# Patient Record
Sex: FEMALE | Race: WHITE | Employment: FULL TIME | URBAN - METROPOLITAN AREA
[De-identification: names, ages, dates, MRNs, and addresses within clinical notes are randomized per-mention and may not be internally consistent; named-entity substitution may affect disease eponyms.]

---

## 2017-03-17 PROBLEM — E66.3 OVERWEIGHT: Status: ACTIVE | Noted: 2017-03-17

## 2017-09-15 PROBLEM — E66.9 OBESITY (BMI 30-39.9): Status: ACTIVE | Noted: 2017-03-17

## 2019-08-16 ENCOUNTER — HOSPITAL ENCOUNTER (OUTPATIENT)
Dept: LAB | Age: 54
Discharge: HOME | End: 2019-08-16

## 2019-08-16 PROCEDURE — 88312 SPECIAL STAINS GROUP 1: CPT

## 2019-08-16 PROCEDURE — 88305 TISSUE EXAM BY PATHOLOGIST: CPT

## 2022-03-19 PROBLEM — E66.9 OBESITY (BMI 30-39.9): Status: ACTIVE | Noted: 2017-03-17

## 2023-08-01 ENCOUNTER — HOSPITAL ENCOUNTER (OUTPATIENT)
Dept: PHYSICAL THERAPY | Age: 58
Setting detail: RECURRING SERIES
Discharge: HOME | End: 2023-08-04
Payer: COMMERCIAL

## 2023-08-01 PROCEDURE — 97110 THERAPEUTIC EXERCISES: CPT

## 2023-08-01 PROCEDURE — 97161 PT EVAL LOW COMPLEX 20 MIN: CPT

## 2023-08-01 ASSESSMENT — PAIN SCALES - GENERAL: PAINLEVEL_OUTOF10: 5
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
## 2023-08-01 NOTE — PROGRESS NOTES
Dandre Charles  : 1965  [facility]: [facility]aveed [facility] ([facility]  Secondary:  [address] @ [address] [address] [facility] [address]  Phone: [phone]  Fax: [phone] Plan Frequency: 2 times/week    Plan of Care/Certification Expiration Date: 23      PT Visit Info:  Plan Frequency: 2 times/week  Plan of Care/Certification Expiration Date: 23      Visit Count:  1    OUTPATIENT PHYSICAL THERAPY:OP NOTE TYPE: OP Note Type: Treatment Note 2023       Episode  }Appt Desk             Treatment Diagnosis:  Pain in Left Knee (M25.562)  Pain in Right Knee (M25.561)  Other abnormalities of gait and mobility (R26.89)  Generalized Muscle Weakness (M62.81)  Other Low Back Pain (M54.59)    Goals: (Goals have been discussed and agreed upon with patient.)  Short-Term Functional Goals: Time Frame: 4 weeks  Pt to report compliance with HEP  Pt to demonstrate fluid movement patterns without cues  Discharge Goals: Time Frame: 8 weeks  Pt to increase strength for sit to stand x 30 reps without UE assist  Pt to increase strength for reciprocal gait on stairs with controlled descent  Pt to report ability to perform comprehensive HEP successfully    Medical/Referring Diagnosis:  Bilateral primary osteoarthritis of knee [M17.0]  Referring Physician:  INDU Cabrera MD Orders:  PT Eval and Treat   Date of Onset:  Onset Date:  (years)     Allergies:   Atorvastatin, Colesevelam, Ezetimibe-simvastatin, and Simvastatin  Restrictions/Precautions:  No data recordedNo data recorded     Interventions Planned (Treatment may consist of any combination of the following):    Current Treatment Recommendations: Strengthening; ROM; Balance training; Endurance training; Manual; Pain management; Home exercise program; Modalities; Aquatics     Subjective Comments:pt presents with chronic knee, back, neck pain due to faulty movement patterns, weakness, stiffness, altered gait, degenerative changes.   Initial:

## 2023-08-04 ENCOUNTER — HOSPITAL ENCOUNTER (OUTPATIENT)
Dept: PHYSICAL THERAPY | Age: 58
Setting detail: RECURRING SERIES
Discharge: HOME | End: 2023-08-07
Payer: COMMERCIAL

## 2023-08-04 PROCEDURE — 97113 AQUATIC THERAPY/EXERCISES: CPT

## 2023-08-04 NOTE — PROGRESS NOTES
Rachel Lagos  : 1965  Primary: Trevor Tillman ([facility])  Secondary:  [place] [facility] [address] [facility] [address]  Phone: [phone]  Fax: [phone] Plan Frequency: 2 times/week    Plan of Care/Certification Expiration Date: 23      PT Visit Info:  Plan Frequency: 2 times/week  Plan of Care/Certification Expiration Date: 23      Visit Count:  2    OUTPATIENT PHYSICAL THERAPY:OP NOTE TYPE: OP Note Type: Treatment Note 2023       Episode  }Appt Desk             Treatment Diagnosis:  Pain in Left Knee (M25.562)  Pain in Right Knee (M25.561)  Other abnormalities of gait and mobility (R26.89)  Generalized Muscle Weakness (M62.81)  Other Low Back Pain (M54.59)    Goals: (Goals have been discussed and agreed upon with patient.)  Short-Term Functional Goals: Time Frame: 4 weeks  Pt to report compliance with HEP  Pt to demonstrate fluid movement patterns without cues  Discharge Goals: Time Frame: 8 weeks  Pt to increase strength for sit to stand x 30 reps without UE assist  Pt to increase strength for reciprocal gait on stairs with controlled descent  Pt to report ability to perform comprehensive HEP successfully    Medical/Referring Diagnosis:  Bilateral primary osteoarthritis of knee [M17.0]  Referring Physician:  INDU Connor MD Orders:  PT Eval and Treat   Date of Onset:  Onset Date:  (years)     Allergies:   Atorvastatin, Colesevelam, Ezetimibe-simvastatin, and Simvastatin  Restrictions/Precautions:  No data recordedNo data recorded     Interventions Planned (Treatment may consist of any combination of the following):    Current Treatment Recommendations: Strengthening; ROM; Balance training; Endurance training; Manual; Pain management; Home exercise program; Modalities; Aquatics     Subjective Comments: I fell the other night because of something slippery on the floor. My neck still hurts.   Initial: 4/10                       Post Session:  no

## 2023-08-08 ENCOUNTER — HOSPITAL ENCOUNTER (OUTPATIENT)
Dept: PHYSICAL THERAPY | Age: 58
Setting detail: RECURRING SERIES
Discharge: HOME | End: 2023-08-11
Payer: COMMERCIAL

## 2023-08-08 PROCEDURE — 97113 AQUATIC THERAPY/EXERCISES: CPT

## 2023-08-08 NOTE — PROGRESS NOTES
Janessa Lombardi  : 1965  Primary: Gerir Tillman ([facility])  Secondary:  [address] @ [address] [address] [facility] [address]  Phone: [phone]  Fax: [phone] Plan Frequency: 2 times/week    Plan of Care/Certification Expiration Date: 23      PT Visit Info:  Plan Frequency: 2 times/week  Plan of Care/Certification Expiration Date: 23      Visit Count:  3    OUTPATIENT PHYSICAL THERAPY:OP NOTE TYPE: OP Note Type: Treatment Note 2023       Episode  }Appt Desk             Treatment Diagnosis:  Pain in Left Knee (M25.562)  Pain in Right Knee (M25.561)  Other abnormalities of gait and mobility (R26.89)  Generalized Muscle Weakness (M62.81)  Other Low Back Pain (M54.59)    Goals: (Goals have been discussed and agreed upon with patient.)  Short-Term Functional Goals: Time Frame: 4 weeks  Pt to report compliance with HEP  Pt to demonstrate fluid movement patterns without cues  Discharge Goals: Time Frame: 8 weeks  Pt to increase strength for sit to stand x 30 reps without UE assist  Pt to increase strength for reciprocal gait on stairs with controlled descent  Pt to report ability to perform comprehensive HEP successfully    Medical/Referring Diagnosis:  Bilateral primary osteoarthritis of knee [M17.0]  Referring Physician:  Prince Ferreira.INDU MD Orders:  PT Eval and Treat   Date of Onset:  Onset Date:  (years)     Allergies:   Atorvastatin, Colesevelam, Ezetimibe-simvastatin, and Simvastatin  Restrictions/Precautions:  No data recordedNo data recorded     Interventions Planned (Treatment may consist of any combination of the following):    Current Treatment Recommendations: Strengthening; ROM; Balance training; Endurance training; Manual; Pain management; Home exercise program; Modalities; Aquatics     Subjective Comments: I felt pretty nice after the first session in the pool. I tried some of the ex's at home.   Initial: 2/10                       Post Session:  no

## 2023-08-10 ENCOUNTER — HOSPITAL ENCOUNTER (OUTPATIENT)
Dept: PHYSICAL THERAPY | Age: 58
Setting detail: RECURRING SERIES
Discharge: HOME | End: 2023-08-13
Payer: COMMERCIAL

## 2023-08-10 PROCEDURE — 97113 AQUATIC THERAPY/EXERCISES: CPT

## 2023-08-10 NOTE — PROGRESS NOTES
Brodie Uribe  : 1965  [facility]  Secondary:  [address] @ [address] [address] [facility] [address]  Phone: [phone]  Fax: [phone] Plan Frequency: 2 times/week    Plan of Care/Certification Expiration Date: 23      PT Visit Info:  Plan Frequency: 2 times/week  Plan of Care/Certification Expiration Date: 23      Visit Count:  4    OUTPATIENT PHYSICAL THERAPY:OP NOTE TYPE: OP Note Type: Treatment Note 8/10/2023       Episode  }Appt Desk             Treatment Diagnosis:  Pain in Left Knee (M25.562)  Pain in Right Knee (M25.561)  Other abnormalities of gait and mobility (R26.89)  Generalized Muscle Weakness (M62.81)  Other Low Back Pain (M54.59)    Goals: (Goals have been discussed and agreed upon with patient.)  Short-Term Functional Goals: Time Frame: 4 weeks  Pt to report compliance with HEP  Pt to demonstrate fluid movement patterns without cues  Discharge Goals: Time Frame: 8 weeks  Pt to increase strength for sit to stand x 30 reps without UE assist  Pt to increase strength for reciprocal gait on stairs with controlled descent  Pt to report ability to perform comprehensive HEP successfully    Medical/Referring Diagnosis:  Bilateral primary osteoarthritis of knee [M17.0]  Referring Physician:  INDU Lopez MD Orders:  PT Eval and Treat   Date of Onset:  Onset Date:  (years)     Allergies:   Atorvastatin, Colesevelam, Ezetimibe-simvastatin, and Simvastatin  Restrictions/Precautions:  No data recordedNo data recorded     Interventions Planned (Treatment may consist of any combination of the following):    Current Treatment Recommendations: Strengthening; ROM; Balance training; Endurance training; Manual; Pain management; Home exercise program; Modalities; Aquatics     Subjective Comments: My L knee is bothering me. I'm really stressed out.   Initial: 3/10                       Post Session:  no increased pain/10  Medications Last

## 2023-08-15 ENCOUNTER — APPOINTMENT (OUTPATIENT)
Dept: PHYSICAL THERAPY | Age: 58
End: 2023-08-15
Payer: COMMERCIAL

## 2023-08-17 ENCOUNTER — HOSPITAL ENCOUNTER (OUTPATIENT)
Dept: PHYSICAL THERAPY | Age: 58
Setting detail: RECURRING SERIES
End: 2023-08-17
Payer: COMMERCIAL
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
## 2023-08-22 ENCOUNTER — APPOINTMENT (OUTPATIENT)
Dept: PHYSICAL THERAPY | Age: 58
End: 2023-08-22
Payer: COMMERCIAL

## 2023-08-29 ENCOUNTER — HOSPITAL ENCOUNTER (OUTPATIENT)
Dept: PHYSICAL THERAPY | Age: 58
Setting detail: RECURRING SERIES
End: 2023-08-29
Payer: COMMERCIAL

## 2023-08-29 NOTE — DISCHARGE SUMMARY
Ahmet Ruggiero has been seen in physical therapy from 8/1/23 to 8/10/23 for 4 visits. Treatment has been discontinued at this time due to patient cancelling remainder of visits. Goals were not reassessed.   Thank you for this referral.

## 2023-08-31 ENCOUNTER — APPOINTMENT (OUTPATIENT)
Dept: PHYSICAL THERAPY | Age: 58
End: 2023-08-31
Payer: COMMERCIAL